# Patient Record
Sex: FEMALE | Race: WHITE | Employment: OTHER | URBAN - METROPOLITAN AREA
[De-identification: names, ages, dates, MRNs, and addresses within clinical notes are randomized per-mention and may not be internally consistent; named-entity substitution may affect disease eponyms.]

---

## 2022-01-10 ENCOUNTER — VIRTUAL VISIT (OUTPATIENT)
Dept: FAMILY MEDICINE CLINIC | Age: 37
End: 2022-01-10
Payer: MEDICAID

## 2022-01-10 DIAGNOSIS — J06.9 VIRAL URI: Primary | ICD-10-CM

## 2022-01-10 LAB
FLUAV+FLUBV AG NOSE QL IA.RAPID: NEGATIVE
FLUAV+FLUBV AG NOSE QL IA.RAPID: NEGATIVE
VALID INTERNAL CONTROL?: YES

## 2022-01-10 PROCEDURE — 87804 INFLUENZA ASSAY W/OPTIC: CPT

## 2022-01-10 PROCEDURE — 99441 PR PHYS/QHP TELEPHONE EVALUATION 5-10 MIN: CPT

## 2022-01-10 RX ORDER — PREDNISONE 10 MG/1
TABLET ORAL
Qty: 21 TABLET | Refills: 0 | Status: SHIPPED | OUTPATIENT
Start: 2022-01-10 | End: 2022-01-16

## 2022-01-10 RX ORDER — IBUPROFEN 800 MG/1
800 TABLET ORAL
Qty: 30 TABLET | Refills: 0 | Status: SHIPPED | OUTPATIENT
Start: 2022-01-10 | End: 2022-02-14 | Stop reason: ALTCHOICE

## 2022-01-10 NOTE — PROGRESS NOTES
Malou Dickerson is a 39 y.o. female evaluated via telephone on 1/10/2022. Consent:    She and/or health care decision maker is aware that that she may receive a bill for this telephone service, depending on her insurance coverage, and has provided verbal consent to proceed: Yes      Documentation:  I communicated with the patient and/or health care decision maker about a myriad of symptoms which began on Saturday following a COVID exposure last week. She has had extreme fatigue, nasal congestion, cough, chills, decreased appetite, arthralgias, diarrhea, and mild SOB. She has been taking a generic multi-symptom OTC formulation without much relief. She has not been vaccinated against COVID or seasonal flu. Details of this discussion including any medical advice provided: Recommend COVID and influenza testing in the office today. She is aware that she should isolate at home pending the test results. I affirm this is a Patient Initiated Episode with an Established Patient who has not had a related appointment within my department in the past 7 days or scheduled within the next 24 hours. ASSESSMENT AND PLAN:       ICD-10-CM ICD-9-CM    1. Viral URI  J06.9 465.9 predniSONE (DELTASONE) 10 mg tablet      ibuprofen (MOTRIN) 800 mg tablet      NOVEL CORONAVIRUS (COVID-19)      AMB POC SKYLER INFLUENZA A/B TEST       Patient aware of plan of care and verbalized understanding. Questions answered. RTC PRN or sooner if needed.     Topher Park NP    Total Time: minutes: 5-10 minutes    Note: not billable if this call serves to triage the patient into an appointment for the relevant concern      Topher Park NP

## 2022-01-10 NOTE — PROGRESS NOTES
1. Have you been to the ER, urgent care clinic since your last visit? NoHospitalized since your last visit? No    2. Have you seen or consulted any other health care providers outside of the [address] [place] [address] since your last visit? Include any pap smears or colon screening.  No

## 2022-01-10 NOTE — PROGRESS NOTES
Your flu test was negative. We will contact you with the COVID test result which should be back in about 2 days. Meanwhile, isolate at home and wear a well-fitting mask if you have to go out.

## 2022-01-10 NOTE — PATIENT INSTRUCTIONS
Coronavirus (BDLTU-69): Care Instructions  Overview  The coronavirus disease (COVID-19) is caused by a virus. Symptoms may include a fever, a cough, and shortness of breath. It can spread through droplets from coughing and sneezing, breathing, and singing. The virus also can spread when people are in close contact with someone who is infected. Most people have mild symptoms and can take care of themselves at home. If their symptoms get worse, they may need care in a hospital. Treatment may include medicines to reduce symptoms, plus breathing support such as oxygen therapy or a ventilator. It's important to not spread the virus to others. If you have COVID-19, wear a mask anytime you are around other people. It can help stop the spread of the virus. You need to isolate yourself while you are sick. Leave your home only if you need to get medical care or testing. Follow-up care is a key part of your treatment and safety. Be sure to make and go to all appointments, and call your doctor if you are having problems. It's also a good idea to know your test results and keep a list of the medicines you take. How can you care for yourself at home? · Get extra rest. It can help you feel better. · Drink plenty of fluids. This helps replace fluids lost from fever. Fluids may also help ease a scratchy throat. · You can take acetaminophen (Tylenol) or ibuprofen (Advil, Motrin) to reduce a fever. It may also help with muscle and body aches. Read and follow all instructions on the label. · Use petroleum jelly on sore skin. This can help if the skin around your nose and lips becomes sore from rubbing a lot with tissues. If you use oxygen, use a water-based product instead of petroleum jelly. · Keep track of symptoms such as fever and shortness of breath. This can help you know if you need to call your doctor. It can also help you know when it's safe to be around other people.   · In some cases, your doctor might suggest that you get a pulse oximeter. How can you self-isolate when you have COVID-19? If you have COVID-19, there are things you can do to help avoid spreading the virus to others. · Limit contact with people in your home. If possible, stay in a separate bedroom and use a separate bathroom. · Wear a mask when you are around other people. · If you have to leave home, avoid crowds and try to stay at least 6 feet away from other people. · Avoid contact with pets and other animals. · Cover your mouth and nose with a tissue when you cough or sneeze. Then throw it in the trash right away. · Wash your hands often, especially after you cough or sneeze. Use soap and water, and scrub for at least 20 seconds. If soap and water aren't available, use an alcohol-based hand . · Don't share personal household items. These include bedding, towels, cups and glasses, and eating utensils. · [address] in the warmest water allowed for the fabric type, and dry it completely. It's okay to wash other people's laundry with yours. · Clean and disinfect your home. Use household  and disinfectant wipes or sprays. When can you end self-isolation for COVID-19? If you know or think that you have the virus, you will need to self-isolate. You can be around others after:  · It's been at least 10 days since your symptoms started and  · You haven't had a fever for 24 hours without taking medicines to lower the fever and  · Your symptoms are improving. If you tested positive but have no symptoms, you can end isolation after 10 days. But if you start to have symptoms, follow the steps above. Ask your doctor if you need to be tested before you end isolation. This is especially important if you have a weakened immune system. When should you call for help? Call 911 anytime you think you may need emergency care. For example, call if you have life-threatening symptoms, such as:    · You have severe trouble breathing.  (You can't talk at all.)     · You have constant chest pain or pressure.     · You are severely dizzy or lightheaded.     · You are confused or can't think clearly.     · You have pale, gray, or blue-colored skin or lips.     · You pass out (lose consciousness) or are very hard to wake up. Call your doctor now or seek immediate medical care if:    · You have moderate trouble breathing. (You can't speak a full sentence.)     · You are coughing up blood (more than about 1 teaspoon).     · You have signs of low blood pressure. These include feeling lightheaded; being too weak to stand; and having cold, pale, clammy skin. Watch closely for changes in your health, and be sure to contact your doctor if:    · Your symptoms get worse.     · You are not getting better as expected.     · You have new or worse symptoms of anxiety, depression, nightmares, or flashbacks. Call before you go to the doctor's office. Follow their instructions. And wear a mask. Current as of: July 1, 2021               Content Version: 13.0  © 2006-2021 [employer]. Care instructions adapted under license by "[employer]" (which disclaims liability or warranty for this information). If you have questions about a medical condition or this instruction, always ask your healthcare professional. [address] any warranty or liability for your use of this information. Upper Respiratory Infection (Cold): Care Instructions  Your Care Instructions     An upper respiratory infection, or URI, is an infection of the nose, sinuses, or throat. URIs are spread by coughs, sneezes, and direct contact. The common cold is the most frequent kind of URI. The flu and sinus infections are other kinds of URIs. Almost all URIs are caused by viruses. Antibiotics won't cure them. But you can treat most infections with home care. This may include drinking lots of fluids and taking over-the-counter pain medicine.  You will probably feel better in 4 to 10 days. The doctor has checked you carefully, but problems can develop later. If you notice any problems or new symptoms, get medical treatment right away. Follow-up care is a key part of your treatment and safety. Be sure to make and go to all appointments, and call your doctor if you are having problems. It's also a good idea to know your test results and keep a list of the medicines you take. How can you care for yourself at home? · To prevent dehydration, drink plenty of fluids. Choose water and other clear liquids until you feel better. If you have kidney, heart, or liver disease and have to limit fluids, talk with your doctor before you increase the amount of fluids you drink. · Take an over-the-counter pain medicine, such as acetaminophen (Tylenol), ibuprofen (Advil, Motrin), or naproxen (Aleve). Read and follow all instructions on the label. · Before you use cough and cold medicines, check the label. These medicines may not be safe for young children or for people with certain health problems. · Be careful when taking over-the-counter cold or flu medicines and Tylenol at the same time. Many of these medicines have acetaminophen, which is Tylenol. Read the labels to make sure that you are not taking more than the recommended dose. Too much acetaminophen (Tylenol) can be harmful. · Get plenty of rest.  · Do not smoke or allow others to smoke around you. If you need help quitting, talk to your doctor about stop-smoking programs and medicines. These can increase your chances of quitting for good. When should you call for help? Call 911 anytime you think you may need emergency care. For example, call if:    · You have severe trouble breathing. Call your doctor now or seek immediate medical care if:    · You seem to be getting much sicker.     · You have new or worse trouble breathing.     · You have a new or higher fever.     · You have a new rash.    Watch closely for changes in your health, and be sure to contact your doctor if:    · You have a new symptom, such as a sore throat, an earache, or sinus pain.     · You cough more deeply or more often, especially if you notice more mucus or a change in the color of your mucus.     · You do not get better as expected. Where can you learn more? Go to http://www.gray.com/  Enter K520 in the search box to learn more about \"Upper Respiratory Infection (Cold): Care Instructions. \"  Current as of: July 6, 2021               Content Version: 13.0  © 8216-0292 [employer]. Care instructions adapted under license by [employer] (which disclaims liability or warranty for this information). If you have questions about a medical condition or this instruction, always ask your healthcare professional. [address] any warranty or liability for your use of this information.

## 2022-01-12 LAB
SARS-COV-2, NAA 2 DAY TAT: NORMAL
SARS-COV-2, NAA: DETECTED

## 2022-01-12 NOTE — PROGRESS NOTES
PC to inform patient of positive Covid test result / advice, voice MB has not been set up yet, so was unable to leave a VM.

## 2022-01-12 NOTE — PROGRESS NOTES
Your COVID test is positive. Your symptoms began 1/8 and your positive test was collected 1/10; you should continue to isolate until tomorrow and continue to wear a mask when out of the house until 1/18. If you are still having fevers, you should isolate until you are fever free for at least 24 hours.

## 2022-01-26 ENCOUNTER — TELEPHONE (OUTPATIENT)
Dept: FAMILY MEDICINE CLINIC | Age: 37
End: 2022-01-26

## 2022-01-26 NOTE — TELEPHONE ENCOUNTER
----- Message from Duncan Campbella sent at 1/26/2022 11:06 AM EST -----  Subject: Message to Provider    QUESTIONS  Information for Provider? Patient is in need of a note for court stating   that she and her son Roderick Graham had tested positive for Covid January 9th   or 10th. There is also a message in [place] chart as well because he needs   another Covid test for school along with a note.   ---------------------------------------------------------------------------  --------------  [address]  What is the best way for the office to contact you? Do not leave any   message, patient will call back for answer  Preferred Call Back Phone Number? [phone]  ---------------------------------------------------------------------------  --------------  SCRIPT ANSWERS  Relationship to Patient?  Self

## 2022-01-27 ENCOUNTER — VIRTUAL VISIT (OUTPATIENT)
Dept: FAMILY MEDICINE CLINIC | Age: 37
End: 2022-01-27
Payer: MEDICAID

## 2022-01-27 DIAGNOSIS — U07.1 COVID-19: Primary | ICD-10-CM

## 2022-01-27 PROCEDURE — 99213 OFFICE O/P EST LOW 20 MIN: CPT | Performed by: NURSE PRACTITIONER

## 2022-01-27 NOTE — LETTER
1/27/2022 11:18 AM    Ms. Jeffery Suazo  [address] [facility] [address]      To Whom It May Concern: The patient was seen in our office on 1/10/22 and tested positive for COVID. She was seen again today with continued intermittent symptoms. Please have the patient contact me at my office at [phone] if anything further is required.     Sincerely,          Anna Kaufman NP

## 2022-01-27 NOTE — PROGRESS NOTES
Chief Complaint   Patient presents with    Advice Only     COVID release note     1. Have you been to the ER, urgent care clinic since your last visit? Hospitalized since your last visit? No    2. Have you seen or consulted any other health care providers outside of the [address] [place] [address] since your last visit? Include any pap smears or colon screening. No  Fall Risk Assessment, last 12 mths 1/27/2022   Able to walk? Yes   Fall in past 12 months? 0   Do you feel unsteady? 0   Are you worried about falling 0     Abuse Screening Questionnaire 1/27/2022   Do you ever feel afraid of your partner? N   Are you in a relationship with someone who physically or mentally threatens you? N   Is it safe for you to go home?  Y     3 most recent PHQ Screens 1/10/2022   Little interest or pleasure in doing things Several days   Feeling down, depressed, irritable, or hopeless (No Data)   Total Score PHQ 2 -     Learning Assessment 1/27/2022   PRIMARY LEARNER Patient   HIGHEST LEVEL OF EDUCATION - PRIMARY LEARNER  GRADUATED HIGH SCHOOL OR GED   BARRIERS PRIMARY LEARNER NONE   CO-LEARNER CAREGIVER No   CO-LEARNER NAME -   PRIMARY LANGUAGE ENGLISH   LEARNER PREFERENCE PRIMARY READING   ANSWERED BY Patient   RELATIONSHIP SELF

## 2022-01-27 NOTE — PATIENT INSTRUCTIONS

## 2022-01-27 NOTE — PROGRESS NOTES
Genie Vela is a 39 y.o. female who was seen by synchronous (real-time) audio-video technology on 1/27/2022. This encounter was conducted via D[employer]. me. Consent:  Genie Vela, who was evaluated through a synchronous (real-time) audio-video encounter, and/or her healthcare decision maker, is aware that it is a billable service, which includes applicable co-pays, with coverage as determined by her insurance carrier. She provided verbal consent to proceed and patient identification was verified. This visit was conducted pursuant to the emergency declaration under the [facility], [address] [place] [address] authority and the [employer] and [employer] General Act. A caregiver was present when appropriate. Ability to conduct physical exam was limited. The patient was located at home in a state where the provider was licensed to provide care. --Miah Epps NP on 1/27/2022 at 1:06 PM    I was in the office while conducting this encounter. 712  Subjective:   HPI: Genie Vela was seen for Advice Only (COVID release note)    The patient is seen with a CC of lingering COVID symptoms. She was tested on 1/10/22 and she was positive. She denies fever. She reports some lingering congestion, cough, and intermittent diarrhea. She needs documentation to provide to court about her diagnosis. Allergies   Allergen Reactions    Flagyl [Metronidazole] Other (comments)     Headache, Sore throat       Current Outpatient Medications   Medication Sig    ibuprofen (MOTRIN) 800 mg tablet Take 1 Tablet by mouth every eight (8) hours as needed for Pain. Indications: pain    montelukast (SINGULAIR) 10 mg tablet Take 1 Tab by mouth nightly as needed for Other (allergies). Indications: inflammation of the nose due to an allergy    diclofenac EC (VOLTAREN) 75 mg EC tablet Take 1 Tab by mouth two (2) times a day. Start after prednisone.  (Patient taking differently: Take 75 mg by mouth two (2) times a day. Start after prednisone. As needed)     No current facility-administered medications for this visit. Past Medical History:   Diagnosis Date    Asthma     Chicken pox     LESLIE II (cervical intraepithelial neoplasia II) 9/2010    Cystic fibrosis screening     negative    IUD contraception     Mirena 9/16/2010 -> 4/5/2011    Menarche     onset at age 6    Pyelonephritis 10/2004       Family History   Problem Relation Age of Onset    Cancer Paternal Uncle         Leukemia    Hypertension Maternal Grandfather        ROS:  Denies fever, chills, + cough, chest pain, SOB,  nausea, vomiting, + diarrhea, dysuria. Denies rashes, wounds, arthralgias, weakness, numbness, visual changes, depression. Denies wt loss, wt gain, hemoptysis, hematochezia or melena. Patient is not experiencing chest pain radiating to the jaw and/or down the arms. PHYSICAL EXAMINATION:    Vital Signs: (As obtained by patient/caregiver at home)  There were no vitals taken for this visit.      Constitutional: [x] Appears well-developed and well-nourished [x] No apparent distress      [] Abnormal -     Mental status: [x] Alert and awake  [x] Oriented to person/place/time [x] Able to follow commands    [] Abnormal -     Eyes:   EOM    [x]  Normal    [] Abnormal -   Sclera  [x]  Normal    [] Abnormal -          Discharge [x]  None visible   [] Abnormal -     HENT: [x] Normocephalic, atraumatic  [] Abnormal -   [x] Mouth/Throat: Mucous membranes are moist    External Ears [x] Normal  [] Abnormal -    Neck: [x] No visualized mass [] Abnormal -     Pulmonary/Chest: [x] Respiratory effort normal   [x] No visualized signs of difficulty breathing or respiratory distress        [] Abnormal -      Musculoskeletal:   [x] Normal gait with no signs of ataxia         [x] Normal range of motion of neck        [] Abnormal -     Neurological:        [x] No Facial Asymmetry (Cranial nerve 7 motor function) (limited exam due to video visit)          [x] No gaze palsy        [] Abnormal -          Skin:        [x] No significant exanthematous lesions or discoloration noted on facial skin         [] Abnormal -            Psychiatric:       [x] Normal Affect [] Abnormal -        [x] No Hallucinations    Other pertinent observable physical exam findings:-        Assessment & Plan:       ICD-10-CM ICD-9-CM    1. COVID-19  U07.1 079.89      Discussed anticipated course of recovery. I recommend rest, fluids. Do not take anti-diarrheals. Note provided. Patient aware of plan of care and verbalized understanding. Questions answered. RTC 2 weeks, or sooner if needed. We discussed the expected course, resolution and complications of the diagnosis(es) in detail. Medication risks, benefits, costs, interactions, and alternatives were discussed as indicated. I advised her to contact the office if her condition worsens, changes or fails to improve as anticipated. She expressed understanding with the diagnosis(es) and plan. Pursuant to the emergency declaration under the [facility], [facility] waiver authority and the [employer] General Act, this Virtual  Visit was conducted, with patient's consent, to reduce the patient's risk of exposure to COVID-19 and provide continuity of care for an established patient. Services were provided through a video synchronous discussion virtually to substitute for in-person clinic visit.     Paul Newman NP

## 2022-01-27 NOTE — TELEPHONE ENCOUNTER
PLease add Kyree Chacon to my schedule VV needs a note for court  tested positive for COVID.   Thanks DL

## 2022-02-14 ENCOUNTER — OFFICE VISIT (OUTPATIENT)
Dept: FAMILY MEDICINE CLINIC | Age: 37
End: 2022-02-14
Payer: MEDICAID

## 2022-02-14 VITALS
DIASTOLIC BLOOD PRESSURE: 60 MMHG | TEMPERATURE: 98.1 F | WEIGHT: 136.6 LBS | HEART RATE: 104 BPM | OXYGEN SATURATION: 98 % | BODY MASS INDEX: 24.2 KG/M2 | SYSTOLIC BLOOD PRESSURE: 130 MMHG | HEIGHT: 63 IN | RESPIRATION RATE: 20 BRPM

## 2022-02-14 DIAGNOSIS — F41.9 ANXIETY: ICD-10-CM

## 2022-02-14 DIAGNOSIS — F43.9 STRESS: Primary | ICD-10-CM

## 2022-02-14 PROCEDURE — 99214 OFFICE O/P EST MOD 30 MIN: CPT | Performed by: NURSE PRACTITIONER

## 2022-02-14 RX ORDER — SERTRALINE HYDROCHLORIDE 25 MG/1
25 TABLET, FILM COATED ORAL DAILY
Qty: 30 TABLET | Refills: 0 | Status: SHIPPED | OUTPATIENT
Start: 2022-02-14 | End: 2022-03-17 | Stop reason: SDUPTHER

## 2022-02-15 NOTE — PATIENT INSTRUCTIONS
Learning About Stress  What is stress? Stress is what you feel when you have to handle more than you are used to. Stress is a fact of life for most people, and it affects everyone differently. What causes stress for you may not be stressful for someone else. A lot of things can cause stress. You may feel stress when you go on a job interview, take a test, or run a race. This kind of short-term stress is normal and even useful. It can help you if you need to work hard or react quickly. For example, stress can help you finish an important job on time. Stress also can last a long time. Long-term stress is caused by stressful situations or events. Examples of long-term stress include long-term health problems, ongoing problems at work, or conflicts in your family. Long-term stress can harm your health. How does stress affect your health? When you are stressed, your body responds as though you are in danger. It makes hormones that speed up your heart, make you breathe faster, and give you a burst of energy. This is called the fight-or-flight stress response. If the stress is over quickly, your body goes back to normal and no harm is done. But if stress happens too often or lasts too long, it can have bad effects. Long-term stress can make you more likely to get sick, and it can make symptoms of some diseases worse. If you tense up when you are stressed, you may develop neck, shoulder, or low back pain. Stress is linked to high blood pressure and heart disease. Stress also harms your emotional health. It can make you espinosa, tense, or depressed. Your relationships may suffer, and you may not do well at work or school. What can you do to manage stress? How to relax your mind   · Write. It may help to write about things that are bothering you. This helps you find out how much stress you feel and what is causing it. When you know this, you can find better ways to cope. · Let your feelings out.  Talk, laugh, cry, and express anger when you need to. Talking with friends, family, a counselor, or a member of the clergy about your feelings is a healthy way to relieve stress. · Do something you enjoy. For example, listen to music or go to a movie. Practice your hobby or do volunteer work. · Meditate. This can help you relax, because you are not worrying about what happened before or what may happen in the future. · Do guided imagery. Imagine yourself in any setting that helps you feel calm. You can use audiotapes, books, or a teacher to guide you. How to relax your body   · Do something active. Exercise or activity can help reduce stress. Walking is a great way to get started. Even everyday activities such as housecleaning or yard work can help. · Do breathing exercises. For example:  ? From a standing position, bend forward from the waist with your knees slightly bent. Let your arms dangle close to the floor. ? Breathe in slowly and deeply as you return to a standing position. Roll up slowly and lift your head last.  ? Hold your breath for just a few seconds in the standing position. ? Breathe out slowly and bend forward from the waist.  · Try yoga or bg chi. These techniques combine exercise and meditation. You may need some training at first to learn them. What can you do to prevent stress? · Manage your time. This helps you find time to do the things you want and need to do. · Get enough sleep. Your body recovers from the stresses of the day while you are sleeping. · Get support. Your family, friends, and community can make a difference in how you experience stress. Where can you learn more? Go to http://www.gray.com/  Enter [id number] in the search box to learn more about \"Learning About Stress. \"  Current as of: June 16, 2021               Content Version: 13.0  © 6744-4342 [employer].    Care instructions adapted under license by [employer] (which disclaims liability or warranty for this information). If you have questions about a medical condition or this instruction, always ask your healthcare professional. [place] [address] any warranty or liability for your use of this information.
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
## 2022-03-17 ENCOUNTER — OFFICE VISIT (OUTPATIENT)
Dept: FAMILY MEDICINE CLINIC | Age: 37
End: 2022-03-17
Payer: MEDICAID

## 2022-03-17 VITALS
HEIGHT: 63 IN | BODY MASS INDEX: 23.5 KG/M2 | RESPIRATION RATE: 20 BRPM | TEMPERATURE: 98 F | HEART RATE: 107 BPM | SYSTOLIC BLOOD PRESSURE: 130 MMHG | WEIGHT: 132.6 LBS | OXYGEN SATURATION: 98 % | DIASTOLIC BLOOD PRESSURE: 70 MMHG

## 2022-03-17 DIAGNOSIS — F43.9 STRESS: Primary | ICD-10-CM

## 2022-03-17 DIAGNOSIS — F41.9 ANXIETY: ICD-10-CM

## 2022-03-17 PROCEDURE — 99214 OFFICE O/P EST MOD 30 MIN: CPT | Performed by: NURSE PRACTITIONER

## 2022-03-17 RX ORDER — SERTRALINE HYDROCHLORIDE 50 MG/1
50 TABLET, FILM COATED ORAL DAILY
Qty: 30 TABLET | Refills: 5 | Status: SHIPPED | OUTPATIENT
Start: 2022-03-17 | End: 2022-06-27 | Stop reason: SDUPTHER

## 2022-03-17 NOTE — PROGRESS NOTES
1. \"Have you been to the ER, urgent care clinic since your last visit? Hospitalized since your last visit? \" No    2. \"Have you seen or consulted any other health care providers outside of the [address] [place] [address] since your last visit? \" No     3. For patients aged 39-70: Has the patient had a colonoscopy / FIT/ Cologuard? No      If the patient is female:    4. For patients aged 41-77: Has the patient had a mammogram within the past 2 years? No      5. For patients aged 21-65: Has the patient had a pap smear?  Yes

## 2022-03-17 NOTE — PROGRESS NOTES
Chief Complaint   Patient presents with    Anxiety       HPI:    Lavern Mahmood is a 40 y.o. female in today to follow up on uncontrolled stress, anxiety, and depression which has been building over the past few months. Denies SI. Her  is a great source of support. She reports a lot of significant loss over the past 2-3 years. She is also in a court parekh right now. She reports the sertraline 25 mg has helped significantly, she feels like it \"takes the edge off\". Allergies   Allergen Reactions    Flagyl [Metronidazole] Other (comments)     Headache, Sore throat       Current Outpatient Medications   Medication Sig    sertraline (ZOLOFT) 50 mg tablet Take 1 Tablet by mouth daily. No current facility-administered medications for this visit. Past Medical History:   Diagnosis Date    Asthma     Chicken pox     LESLIE II (cervical intraepithelial neoplasia II) 9/2010    Cystic fibrosis screening     negative    IUD contraception     Mirena 9/16/2010 -> 4/5/2011    Menarche     onset at age 6    Pyelonephritis 10/2004       Family History   Problem Relation Age of Onset    Cancer Paternal Uncle         Leukemia    Hypertension Maternal Grandfather        ROS:  Denies fever, chills, cough, chest pain, SOB,  nausea, vomiting, diarrhea, dysuria. Denies rashes, wounds, arthralgias, weakness, numbness, visual changes, depression. Denies wt loss, wt gain, hemoptysis, hematochezia or melena. Patient is not experiencing chest pain radiating to the jaw and/or down the arms.     Physical Examination:    /70 (BP 1 Location: Right arm, BP Patient Position: Sitting, BP Cuff Size: Adult long)   Pulse (!) 107   Temp 98 °F (36.7 °C) (Temporal)   Resp 20   Ht 5' 3\" (1.6 m)   Wt 132 lb 9.6 oz (60.1 kg)   SpO2 98%   BMI 23.49 kg/m²     Wt Readings from Last 3 Encounters:   03/17/22 132 lb 9.6 oz (60.1 kg)   02/14/22 136 lb 9.6 oz (62 kg)   02/22/20 130 lb (59 kg)     Constitutional: WDWN Female  HENT:  NC/AT  EYES: EOMI, PERRL  Neck:  Supple, no JVD, mass or bruit. No thyromegaly. Respiratory:  Respirations even and unlabored without use of accessory muscles, CTA throughout without wheezes, rales, rubs or rhonchi. Symmetrical chest expansion. Cardiac: Tachycardic  Neurologic:  Smooth, even gait without assistance, CN 2-12 grossly intact. Skin: intact and warm to the touch, no rash   Lymphadenopathy: no cervical or supraclavicular nodes  Psych: Alert and oriented. Today, Luz Alonso seems hyper, anxious. She is very talkative and fidgety. ASSESSMENT AND PLAN:       ICD-10-CM ICD-9-CM    1. Stress  F43.9 V62.89 sertraline (ZOLOFT) 50 mg tablet   2. Anxiety  F41.9 300.00 sertraline (ZOLOFT) 50 mg tablet     Increase sertraline to 50 mg. Encouraged good sleep habits, rest. Do not use any other substances with sertraline like alcohol, marijuana. Medication Side Effects and Warnings were discussed with patient:  yes  Patient Labs were reviewed:  yes  Patient Past Records were reviewed:  yes    Patient aware of plan of care and verbalized understanding. Questions answered. RTC 3 months, or sooner if needed.     Elias Nassar NP

## 2022-03-17 NOTE — PATIENT INSTRUCTIONS
Learning About Stress  What is stress? Stress is what you feel when you have to handle more than you are used to. Stress is a fact of life for most people, and it affects everyone differently. What causes stress for you may not be stressful for someone else. A lot of things can cause stress. You may feel stress when you go on a job interview, take a test, or run a race. This kind of short-term stress is normal and even useful. It can help you if you need to work hard or react quickly. For example, stress can help you finish an important job on time. Stress also can last a long time. Long-term stress is caused by stressful situations or events. Examples of long-term stress include long-term health problems, ongoing problems at work, or conflicts in your family. Long-term stress can harm your health. How does stress affect your health? When you are stressed, your body responds as though you are in danger. It makes hormones that speed up your heart, make you breathe faster, and give you a burst of energy. This is called the fight-or-flight stress response. If the stress is over quickly, your body goes back to normal and no harm is done. But if stress happens too often or lasts too long, it can have bad effects. Long-term stress can make you more likely to get sick, and it can make symptoms of some diseases worse. If you tense up when you are stressed, you may develop neck, shoulder, or low back pain. Stress is linked to high blood pressure and heart disease. Stress also harms your emotional health. It can make you espinosa, tense, or depressed. Your relationships may suffer, and you may not do well at work or school. What can you do to manage stress? How to relax your mind   · Write. It may help to write about things that are bothering you. This helps you find out how much stress you feel and what is causing it. When you know this, you can find better ways to cope. · Let your feelings out.  Talk, laugh, cry, and express anger when you need to. Talking with friends, family, a counselor, or a member of the clergy about your feelings is a healthy way to relieve stress. · Do something you enjoy. For example, listen to music or go to a movie. Practice your hobby or do volunteer work. · Meditate. This can help you relax, because you are not worrying about what happened before or what may happen in the future. · Do guided imagery. Imagine yourself in any setting that helps you feel calm. You can use audiotapes, books, or a teacher to guide you. How to relax your body   · Do something active. Exercise or activity can help reduce stress. Walking is a great way to get started. Even everyday activities such as housecleaning or yard work can help. · Do breathing exercises. For example:  ? From a standing position, bend forward from the waist with your knees slightly bent. Let your arms dangle close to the floor. ? Breathe in slowly and deeply as you return to a standing position. Roll up slowly and lift your head last.  ? Hold your breath for just a few seconds in the standing position. ? Breathe out slowly and bend forward from the waist.  · Try yoga or bg chi. These techniques combine exercise and meditation. You may need some training at first to learn them. What can you do to prevent stress? · Manage your time. This helps you find time to do the things you want and need to do. · Get enough sleep. Your body recovers from the stresses of the day while you are sleeping. · Get support. Your family, friends, and community can make a difference in how you experience stress. Where can you learn more? Go to http://www.gray.com/  Enter [id number] in the search box to learn more about \"Learning About Stress. \"  Current as of: June 16, 2021               Content Version: 13.2  © 3103-3764 [employer].    Care instructions adapted under license by [employer] (which disclaims liability or warranty for this information). If you have questions about a medical condition or this instruction, always ask your healthcare professional. [place] [address] any warranty or liability for your use of this information.

## 2022-04-18 ENCOUNTER — APPOINTMENT (OUTPATIENT)
Dept: GENERAL RADIOLOGY | Age: 37
End: 2022-04-18
Attending: FAMILY MEDICINE
Payer: MEDICAID

## 2022-04-18 ENCOUNTER — HOSPITAL ENCOUNTER (EMERGENCY)
Age: 37
Discharge: HOME | End: 2022-04-18
Attending: FAMILY MEDICINE
Payer: MEDICAID

## 2022-04-18 ENCOUNTER — APPOINTMENT (OUTPATIENT)
Dept: CT IMAGING | Age: 37
End: 2022-04-18
Attending: FAMILY MEDICINE
Payer: MEDICAID

## 2022-04-18 VITALS
SYSTOLIC BLOOD PRESSURE: 114 MMHG | HEART RATE: 65 BPM | TEMPERATURE: 98.6 F | DIASTOLIC BLOOD PRESSURE: 69 MMHG | WEIGHT: 132 LBS | HEIGHT: 63 IN | RESPIRATION RATE: 16 BRPM | OXYGEN SATURATION: 98 % | BODY MASS INDEX: 23.39 KG/M2

## 2022-04-18 DIAGNOSIS — S80.12XA: ICD-10-CM

## 2022-04-18 DIAGNOSIS — S81.819A: ICD-10-CM

## 2022-04-18 DIAGNOSIS — S80.11XA CONTUSION OF MULTIPLE SITES OF RIGHT LOWER EXTREMITY, INITIAL ENCOUNTER: ICD-10-CM

## 2022-04-18 DIAGNOSIS — M62.838 CERVICAL PARASPINAL MUSCLE SPASM: ICD-10-CM

## 2022-04-18 DIAGNOSIS — S16.1XXA STRAIN OF NECK MUSCLE, INITIAL ENCOUNTER: Primary | ICD-10-CM

## 2022-04-18 PROCEDURE — 90471 IMMUNIZATION ADMIN: CPT

## 2022-04-18 PROCEDURE — 72125 CT NECK SPINE W/O DYE: CPT

## 2022-04-18 PROCEDURE — 73590 X-RAY EXAM OF LOWER LEG: CPT

## 2022-04-18 PROCEDURE — 90715 TDAP VACCINE 7 YRS/> IM: CPT | Performed by: FAMILY MEDICINE

## 2022-04-18 PROCEDURE — 74011250636 HC RX REV CODE- 250/636: Performed by: FAMILY MEDICINE

## 2022-04-18 PROCEDURE — 99284 EMERGENCY DEPT VISIT MOD MDM: CPT

## 2022-04-18 RX ORDER — KETOROLAC TROMETHAMINE 30 MG/ML
15 INJECTION, SOLUTION INTRAMUSCULAR; INTRAVENOUS
Status: DISCONTINUED | OUTPATIENT
Start: 2022-04-18 | End: 2022-04-18 | Stop reason: HOSPADM

## 2022-04-18 RX ORDER — NAPROXEN 500 MG/1
500 TABLET ORAL 2 TIMES DAILY WITH MEALS
Qty: 20 TABLET | Refills: 0 | Status: SHIPPED | OUTPATIENT
Start: 2022-04-18 | End: 2022-04-28

## 2022-04-18 RX ORDER — CYCLOBENZAPRINE HCL 10 MG
10 TABLET ORAL
Qty: 20 TABLET | Refills: 0 | Status: SHIPPED | OUTPATIENT
Start: 2022-04-18 | End: 2022-04-25

## 2022-04-18 RX ADMIN — TETANUS TOXOID, REDUCED DIPHTHERIA TOXOID AND ACELLULAR PERTUSSIS VACCINE, ADSORBED 0.5 ML: 5; 2.5; 8; 8; 2.5 SUSPENSION INTRAMUSCULAR at 04:17

## 2022-04-18 NOTE — Clinical Note
[address] [place] [address] EMERGENCY DEP  [address] [facility] [address] [place] [id number]  [phone]    Work/School Note    Date: 4/18/2022    To Whom It May concern:    Dionicia Paget was seen and treated today in the emergency room by the following provider(s):  Attending Provider: Rosa Maria Jean MD.      Dionicia Paget is excused from work/school on 04/18/22 and 04/19/22. She is medically clear to return to work/school on 4/20/2022.        Sincerely,          Eduarda Johnson MD
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
## 2022-04-18 NOTE — Clinical Note
[address] [place] [address] EMERGENCY DEP  [address] [facility] [address] [place] [id number]  [phone]    Work/School Note    Date: 4/18/2022    To Whom It May concern:    Jackolyn Lefort was seen and treated today in the emergency room by the following provider(s):  Attending Provider: Doug Beyer MD.      Jackolyn Lefort is excused from work/school on 04/18/22 and 04/19/22. She is medically clear to return to work/school on 4/20/2022.        Sincerely,          Heena Pollack MD
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
## 2022-06-27 ENCOUNTER — OFFICE VISIT (OUTPATIENT)
Dept: FAMILY MEDICINE CLINIC | Age: 37
End: 2022-06-27
Payer: MEDICAID

## 2022-06-27 VITALS
BODY MASS INDEX: 22.68 KG/M2 | WEIGHT: 128 LBS | DIASTOLIC BLOOD PRESSURE: 72 MMHG | HEIGHT: 63 IN | HEART RATE: 100 BPM | TEMPERATURE: 97.4 F | OXYGEN SATURATION: 99 % | RESPIRATION RATE: 20 BRPM | SYSTOLIC BLOOD PRESSURE: 130 MMHG

## 2022-06-27 DIAGNOSIS — F41.9 ANXIETY: ICD-10-CM

## 2022-06-27 DIAGNOSIS — F43.9 STRESS: ICD-10-CM

## 2022-06-27 DIAGNOSIS — F43.21 GRIEF: Primary | ICD-10-CM

## 2022-06-27 PROCEDURE — 99214 OFFICE O/P EST MOD 30 MIN: CPT | Performed by: NURSE PRACTITIONER

## 2022-06-27 RX ORDER — SERTRALINE HYDROCHLORIDE 50 MG/1
75 TABLET, FILM COATED ORAL DAILY
Qty: 45 TABLET | Refills: 5 | Status: SHIPPED | OUTPATIENT
Start: 2022-06-27

## 2022-06-27 NOTE — PATIENT INSTRUCTIONS
Grief (Actual/Anticipated): Care Instructions  Overview     Grief is an emotional and physical reaction to a major loss. The words \"sorrow\" and \"heartache\" often are used to describe feelings of grief. You may feel grief when you lose a beloved person, pet, place, or thing. It is also natural to feel grief when you lose a valued way of life, such as a job, marriage, or good health. You may begin to grieve before a loss occurs. You may grieve for a loved one who is sick and dying. Children and adults often feel the pain of loss before a big move or divorce. Grief is different for each person. There is no \"normal\" or \"expected\" period of time for grieving. Grieving can cause problems such as headaches, loss of appetite, and trouble with thinking or sleeping. You may withdraw from friends and family and behave in ways that are unusual for you. Grief may cause you to question your beliefs and views about life. Grief is natural and does not require medical treatment. It may help to talk with people who have been through or are going through similar losses. You may also want to talk to a counselor about your feelings. Talking about your loss, sharing your cares and concerns, and getting support from others are important parts of healthy grieving. Follow-up care is a key part of your treatment and safety. Be sure to make and go to all appointments, and call your doctor if you are having problems. It's also a good idea to know your test results and keep a list of the medicines you take. How can you care for yourself at home? · Get enough sleep. Missing sleep may make it harder for you to cope with your grief. · Eat healthy foods. Ask someone to join you for a meal if you don't like eating alone. · Get some exercise every day. Even a walk can help you deal with your grief. Other exercises, such as yoga, may also help you manage stress. · Stay involved in your life. Don't withdraw from the activities you enjoy. People you know at work, [employer], clubs, or other groups can help you. · Comfort yourself. Familiar surroundings and special items, such as photos or a loved one's favorite shirt, may give you comfort. · Think about joining a support group. When should you call for help? Call 911 anytime you think you may need emergency care. For example, call if:    · You feel you cannot stop from hurting yourself or someone else. Watch closely for changes in your health, and be sure to contact your doctor if:    · You think you may be depressed.     · You do not get better as expected. Where can you learn more? Go to http://www.gray.com/  Enter H249 in the search box to learn more about \"Grief (Actual/Anticipated): Care Instructions. \"  Current as of: October 18, 2021               Content Version: 13.2  © 2006-2022 [employer]wise, [employer]. Care instructions adapted under license by [employer] (which disclaims liability or warranty for this information). If you have questions about a medical condition or this instruction, always ask your healthcare professional. [place] [address] any warranty or liability for your use of this information.

## 2022-06-27 NOTE — PROGRESS NOTES
Chief Complaint   Patient presents with    Medication Evaluation     3mth check up       HPI:    Helena Mata is a 40 y.o. female in today to follow up on uncontrolled stress, anxiety, and depression. She is on sertraline 50 mg. Blossom lost her  tragically earlier this month. She feels like she's doing well all things considered but understandably has a lot on her plate right now. She feels like the sertraline is helping. Her sons are her greatest support system. Allergies   Allergen Reactions    Flagyl [Metronidazole] Other (comments)     Headache, Sore throat       Current Outpatient Medications   Medication Sig    sertraline (ZOLOFT) 50 mg tablet Take 1.5 Tablets by mouth daily. No current facility-administered medications for this visit. Past Medical History:   Diagnosis Date    Asthma     Chicken pox     LESLIE II (cervical intraepithelial neoplasia II) 9/2010    Cystic fibrosis screening     negative    IUD contraception     Mirena 9/16/2010 -> 4/5/2011    Menarche     onset at age 6    Pyelonephritis 10/2004       Family History   Problem Relation Age of Onset    Cancer Paternal Uncle         Leukemia    Hypertension Maternal Grandfather        ROS:  Denies fever, chills, cough, chest pain, SOB,  nausea, vomiting, diarrhea, dysuria. Denies rashes, wounds, arthralgias, weakness, numbness, visual changes, + depression. Denies wt loss, wt gain, hemoptysis, hematochezia or melena. Patient is not experiencing chest pain radiating to the jaw and/or down the arms.     Physical Examination:    /72 (BP 1 Location: Right arm, BP Patient Position: Sitting, BP Cuff Size: Adult)   Pulse 100   Temp 97.4 °F (36.3 °C) (Temporal)   Resp 20   Ht 5' 3\" (1.6 m)   Wt 128 lb (58.1 kg)   SpO2 99%   BMI 22.67 kg/m²     Wt Readings from Last 3 Encounters:   06/27/22 128 lb (58.1 kg)   04/18/22 132 lb (59.9 kg)   03/17/22 132 lb 9.6 oz (60.1 kg)     Constitutional: WDWN Female  HENT: NC/AT  EYES: EOMI, PERRL  Neck:  Supple, no JVD, mass or bruit. No thyromegaly. Respiratory:  Respirations even and unlabored without use of accessory muscles, CTA throughout without wheezes, rales, rubs or rhonchi. Symmetrical chest expansion. Cardiac: Tachycardic  Neurologic:  Smooth, even gait without assistance, CN 2-12 grossly intact. Skin: intact and warm to the touch, no rash   Lymphadenopathy: no cervical or supraclavicular nodes  Psych: Alert and oriented. Tearful. ASSESSMENT AND PLAN:       ICD-10-CM ICD-9-CM    1. Grief  F43.21 309.0    2. Stress  F43.9 V62.89 sertraline (ZOLOFT) 50 mg tablet   3. Anxiety  F41.9 300.00 sertraline (ZOLOFT) 50 mg tablet     Increase sertraline to 75 mg daily. Discussed talk therapy, social supports, close follow up, grieving process. Medication Side Effects and Warnings were discussed with patient:  yes  Patient Labs were reviewed:  yes  Patient Past Records were reviewed:  yes    Patient aware of plan of care and verbalized understanding. Questions answered. RTC 1-2 months, or sooner if needed.     Jose Juárez NP

## 2022-06-27 NOTE — PROGRESS NOTES
1. \"Have you been to the ER, urgent care clinic since your last visit? Hospitalized since your last visit? \" No    2. \"Have you seen or consulted any other health care providers outside of the [address] [place] [address] since your last visit? \" No     3. For patients aged 39-70: Has the patient had a colonoscopy / FIT/ Cologuard? No      If the patient is female:    4. For patients aged 41-77: Has the patient had a mammogram within the past 2 years? No      5. For patients aged 21-65: Has the patient had a pap smear? Yes - Care Gap present.  Most recent result on file

## 2022-11-09 ENCOUNTER — NURSE TRIAGE (OUTPATIENT)
Dept: OTHER | Facility: CLINIC | Age: 37
End: 2022-11-09

## 2022-11-09 ENCOUNTER — VIRTUAL VISIT (OUTPATIENT)
Dept: FAMILY MEDICINE CLINIC | Age: 37
End: 2022-11-09
Payer: MEDICAID

## 2022-11-09 DIAGNOSIS — F32.9 REACTIVE DEPRESSION: Primary | ICD-10-CM

## 2022-11-09 DIAGNOSIS — F43.21 GRIEF: ICD-10-CM

## 2022-11-09 PROCEDURE — 99213 OFFICE O/P EST LOW 20 MIN: CPT

## 2022-11-09 RX ORDER — SERTRALINE HYDROCHLORIDE 100 MG/1
100 TABLET, FILM COATED ORAL DAILY
Qty: 90 TABLET | Refills: 0 | Status: SHIPPED | OUTPATIENT
Start: 2022-11-09

## 2022-11-09 NOTE — PROGRESS NOTES
Augusto Hickman (: 1985) is a 40 y.o. female, established patient, here for evaluation of the following chief complaint(s): Anxiety (Depression for about 6 months heavy  she lost  in May. Lost sister-in-law last year)       ASSESSMENT/PLAN:  Below is the assessment and plan developed based on review of pertinent history, labs, studies, and medications. 1. Reactive depression  -     sertraline (ZOLOFT) 100 mg tablet; Take 1 Tablet by mouth daily. , Normal, Disp-90 Tablet, R-0  2. Grief  -     sertraline (ZOLOFT) 100 mg tablet; Take 1 Tablet by mouth daily. , Normal, Disp-90 Tablet, R-0    Increase sertraline dose. Recommend grief counseling; provided with local contacts. Discussed healthy coping mechanisms. Return in about 4 weeks (around 2022). SUBJECTIVE/OBJECTIVE:  Augusto Hickman endorses increased stress and depressive symptoms onset about a year ago, worse for the past six months since the sudden death of her ; recently, she undergoing some legal trouble due to her son's truancy and she was notified yesterday that CPS was now involved. She is concerned about losing custody of her son and how she will react if this happens; uses humor or dissociation as coping mechanisms, but finds herself tearful and breaking down with people she trusts. Denies SI/HI, paranoia, delusions, hallucinations. Review of Systems   Constitutional:  Negative for chills, fatigue and fever. Neurological:  Negative for dizziness and headaches. Psychiatric/Behavioral:  Positive for agitation, decreased concentration, dysphoric mood and sleep disturbance. Negative for behavioral problems, confusion, hallucinations, self-injury and suicidal ideas. The patient is nervous/anxious.        [INSTRUCTIONS:  \"[x]\" Indicates a positive item  \"[]\" Indicates a negative item  -- DELETE ALL ITEMS NOT EXAMINED]    Constitutional: [x] Appears well-developed and well-nourished [x] No apparent distress [] Abnormal -     Mental status: [x] Alert and awake  [x] Oriented to person/place/time [x] Able to follow commands    [] Abnormal -     Eyes:   EOM    [x]  Normal    [] Abnormal -   Sclera  [x]  Normal    [] Abnormal -          Discharge [x]  None visible   [] Abnormal -     HENT: [x] Normocephalic, atraumatic  [] Abnormal -   [x] Mouth/Throat: Mucous membranes are moist    External Ears [x] Normal  [] Abnormal -    Neck: [x] No visualized mass [] Abnormal -     Pulmonary/Chest: [x] Respiratory effort normal   [x] No visualized signs of difficulty breathing or respiratory distress        [] Abnormal -      Musculoskeletal:   [x] Normal gait with no signs of ataxia         [x] Normal range of motion of neck        [] Abnormal -     Neurological:        [x] No Facial Asymmetry (Cranial nerve 7 motor function) (limited exam due to video visit)          [x] No gaze palsy        [] Abnormal -          Skin:        [x] No significant exanthematous lesions or discoloration noted on facial skin         [] Abnormal -            Psychiatric:       [] Normal Affect [x] Abnormal - tearful, cooperative, receptive       [x] No Hallucinations    Other pertinent observable physical exam findings:-    On this date 11/09/2022 I have spent 20 minutes reviewing previous notes, test results and face to face (virtual) with the patient discussing the diagnosis and importance of compliance with the treatment plan as well as documenting on the day of the visit. Abiola Clifton, was evaluated through a synchronous (real-time) audio-video encounter. The patient (or guardian if applicable) is aware that this is a billable service, which includes applicable co-pays. This Virtual Visit was conducted with patient's (and/or legal guardian's) consent.  The visit was conducted pursuant to the emergency declaration under the [facility] [employer], 1135 waiver authority and the [employer] Supplemental Appropriations Act. Patient identification was verified, and a caregiver was present when appropriate. The patient was located at: Home: [id number] [facility] [id number]  The provider was located at: Facility ([facility]): [id number] [place] [phone]       An electronic signature was used to authenticate this note.   -- Zion Walker NP

## 2022-11-09 NOTE — PATIENT INSTRUCTIONS
[facility]  *[place] [phone]  *[address] [phone] Main number  *[facility] Record [phone]  Jeanie , YOLANDE Abreu [phone]  Luis Eduardo Kilpatrick, [address] [facility] (mostly kids)   Chiki Tamez, [address] [facility] and Dewayne Fregoso [facility]  [phone]  [facility]Clearance Caryl, [address] [place] [address] office [phone] (all ages)  Sathya Brand, [place]   (limited hours Mon afternoon and some evenings)  [phone] (Ages 13 and above, mostly adults)  Nena Mackey, [address] office, limited hours  mostly kids [facility] office [phone]  [facility] office [phone]  [facility] office [phone] (Substance abuse specialty)

## 2022-11-09 NOTE — PROGRESS NOTES
1. \"Have you been to the ER, urgent care clinic since your last visit? Hospitalized since your last visit? \" No    2. \"Have you seen or consulted any other health care providers outside of the [address] [place] [address] since your last visit? \" No     3. For patients aged 39-70: Has the patient had a colonoscopy / FIT/ Cologuard? NA - based on age      If the patient is female:    4. For patients aged 41-77: Has the patient had a mammogram within the past 2 years? NA - based on age or sex      11. For patients aged 21-65: Has the patient had a pap smear? Yes - no Care Gap present    Chief Complaint   Patient presents with    Anxiety     Depression for about 6 months heavy  she lost  in May.   Lost sister-in-law last year

## 2022-11-09 NOTE — TELEPHONE ENCOUNTER
[facility]    Received call from Shae at [facility] with Red Flag Complaint. Subjective: Caller states \"I have been going through a lot. I can't handle it. I lost my  in May. We have 2 children. I had someone call CPS on me. My on kid has been having truancy. I feel like I might hurt someone if they take my son. My doctor has me on Zoloft, it does help. It helps keep me not be so depressed. Every day is so hard. I can't handle any more stress. I try to be funny about it, but it isn't funny anymore. I have a  but he sucks. They call to say it is anonymous but I know who did it. That adds to the pain. They didn't call to check on me, to be a friend, a family. My  said that is CPS asks you to jump you say how high, and I want to say fuck off. I have to go to court for child protective case. This unexpected loss, I can't deal with that. I can't handle them going somewhere else. I don't have a close family. I have siblings but they have their own life. They gave me less than 24 hours to find someone to take the kids. My son is sick, I don't have anyone in less than 24 hours to take my kids. I can't do it. I can't be as strong as I need to be. I am worried I might snap. I don't know what to do. I feel harassed and attacked. My kids don't need to know this stress, I can't put this on them. I need to be strong, I can't be strong anymore. \"     Current Symptoms: depression. Instructed Monday 4:45pm to find housing for children. No plan to harm known person, fear of reaction when children taken away. States does not want to actually hurt anymore. States not a violent patient. Denies suicidal ideations. States able to do self care, eating, care for kids. States might be hard but keeps her going, states she has to be a mom so she keeps going. Want to see PCP to talk about altering medication and discuss depression. Onset: depression since loss of  in May.  Worsening since parekh with custody of children. Associated Symptoms: pt tearful while speaking with triager. Pain Severity: intermittent headache and intermittent chest pain when her level of stress for the day is high. 0/10 at this time. Temperature: n/a     What has been tried: denies strong support system. Did discuss with patient that if CPS does come to try to get children, try and have a plan on who children would go with since this is a major stressor and increasing her depression. Discussed options of having them go to a known Aunt/Uncle rather than an unknown provider. Pt calmed down and stated she agreed and she has spoken to her children about this as well and how they would probably go to the grandmother who lives next door. Unsure if CPS will really take her children but she is fearful. LMP: NA Pregnant: NA    Recommended disposition: Go to ED/UCC Now (Or to Office with PCP Approval)    Care advice provided, patient verbalizes understanding; denies any other questions or concerns; instructed to call back for any new or worsening symptoms. Writer provided warm transfer to Jose C Burrell at [facility] for 2nd level triage regarding depression. Attention Provider: Thank you for allowing me to participate in the care of your patient. The patient was connected to triage in response to information provided to the [facility]. Please do not respond through this encounter as the response is not directed to a shared pool.     Reason for Disposition   Patient sounds very sick or weak to the triager    Protocols used: Depression-ADULT-[place]

## 2023-02-27 DIAGNOSIS — F32.9 REACTIVE DEPRESSION: ICD-10-CM

## 2023-02-27 DIAGNOSIS — F43.21 GRIEF: ICD-10-CM

## 2023-02-28 RX ORDER — SERTRALINE HYDROCHLORIDE 100 MG/1
100 TABLET, FILM COATED ORAL DAILY
Qty: 90 TABLET | Refills: 0 | Status: SHIPPED | OUTPATIENT
Start: 2023-02-28

## 2023-05-25 RX ORDER — SERTRALINE HYDROCHLORIDE 100 MG/1
TABLET, FILM COATED ORAL
Qty: 90 TABLET | Refills: 0 | Status: SHIPPED | OUTPATIENT
Start: 2023-05-25

## 2023-08-20 NOTE — DISCHARGE INSTRUCTIONS
Heat to the areas that hurt, bacitracin to wound twice a day, naproxen twice a day as needed for pain, Flexeril as needed for spasm, return for uncontrolled pain, weakness numbness or tingling or change in symptoms.   Follow-up symptoms with your primary care doctor within the next 2 to 4 days 4

## 2023-08-21 RX ORDER — SERTRALINE HYDROCHLORIDE 100 MG/1
TABLET, FILM COATED ORAL
Qty: 90 TABLET | Refills: 3 | Status: SHIPPED | OUTPATIENT
Start: 2023-08-21